# Patient Record
Sex: MALE | Race: BLACK OR AFRICAN AMERICAN | NOT HISPANIC OR LATINO | URBAN - METROPOLITAN AREA
[De-identification: names, ages, dates, MRNs, and addresses within clinical notes are randomized per-mention and may not be internally consistent; named-entity substitution may affect disease eponyms.]

---

## 2021-05-09 ENCOUNTER — EMERGENCY (EMERGENCY)
Facility: HOSPITAL | Age: 21
LOS: 1 days | Discharge: HOME | End: 2021-05-09
Attending: EMERGENCY MEDICINE | Admitting: EMERGENCY MEDICINE
Payer: MEDICAID

## 2021-05-09 VITALS
DIASTOLIC BLOOD PRESSURE: 73 MMHG | HEART RATE: 92 BPM | OXYGEN SATURATION: 100 % | TEMPERATURE: 98 F | RESPIRATION RATE: 18 BRPM | SYSTOLIC BLOOD PRESSURE: 134 MMHG

## 2021-05-09 PROCEDURE — 12004 RPR S/N/AX/GEN/TRK7.6-12.5CM: CPT

## 2021-05-09 PROCEDURE — 99284 EMERGENCY DEPT VISIT MOD MDM: CPT | Mod: 25

## 2021-05-09 RX ORDER — CEPHALEXIN 500 MG
1 CAPSULE ORAL
Qty: 28 | Refills: 0
Start: 2021-05-09 | End: 2021-05-15

## 2021-05-09 RX ORDER — DIAZEPAM 5 MG
5 TABLET ORAL ONCE
Refills: 0 | Status: DISCONTINUED | OUTPATIENT
Start: 2021-05-09 | End: 2021-05-09

## 2021-05-09 RX ORDER — CEPHALEXIN 500 MG
250 CAPSULE ORAL ONCE
Refills: 0 | Status: COMPLETED | OUTPATIENT
Start: 2021-05-09 | End: 2021-05-09

## 2021-05-09 RX ADMIN — Medication 5 MILLIGRAM(S): at 12:09

## 2021-05-09 RX ADMIN — Medication 250 MILLIGRAM(S): at 12:09

## 2021-05-09 NOTE — ED PROVIDER NOTE - NSFOLLOWUPINSTRUCTIONS_ED_ALL_ED_FT
You came to the ER with a laceration on your hand. We repaired it and washed it out. You should follow up with a hand surgeon, their number is below. If you are unable to feel your fingers, move your fingers, notice excessive swelling, bleeding, or discharge, color changes, or for any concern please return to the ER.    Please take the antibiotic KEFLEX/CEPHALEXIN as prescribed    Please take motrin and tylenol for the pain. Up to 1,800mg a day of motrin and 3,000mg a day of tylenol in 3 divided doses. You came to the ER with a laceration on your hand. We repaired it and washed it out. You should follow up with a hand surgeon, their number is below. If you are unable to feel your fingers, move your fingers, notice excessive swelling, bleeding, or discharge, color changes, or for any concern please return to the ER.    Please take the antibiotic KEFLEX/CEPHALEXIN as prescribed    Please return to the ER, follow up with a hand specialist, or go to a PCP/clinic to have your sutures removed in 10 days.    Please take motrin and tylenol for the pain. Up to 1,800mg a day of motrin and 3,000mg a day of tylenol in 3 divided doses.

## 2021-05-09 NOTE — ED PROVIDER NOTE - PATIENT PORTAL LINK FT
You can access the FollowMyHealth Patient Portal offered by [facility] by registering at the following website: [email or web address]. By joining [facility]’s FollowMyHealth portal, you will also be able to view your health information using other applications (apps) compatible with our system.

## 2021-05-09 NOTE — ED PROVIDER NOTE - OBJECTIVE STATEMENT
21 y/o M with no PMHx presents to the ED s/p climbing a fence when right hand got caught at the top. Bleeding controlled prior to arrival. Decreased motion due to pain. Sensation intact. Tetanus UTD. No pain medication taken prior to arrival. 21 y/o M with no PMHx presents to the ED s/p climbing a fence when right hand got caught at the top. Bleeding controlled prior to arrival. Decreased motion due to pain. Sensation intact. Tetanus UTD. No pain medication taken prior to arrival. No other injuries, no loc, did not hit head

## 2021-05-09 NOTE — ED PROVIDER NOTE - CLINICAL SUMMARY MEDICAL DECISION MAKING FREE TEXT BOX
21 y/o M with no PMHx with laceration to right palm extending proximal to the 3rd digit MCP, 2cm in jagged to the ulnar side extension along the medial aspect of the 3rd digit past the PIP. Neurovascularly intact. No apparent tendon injury. Pt able to range at MCP, DIP, POP. No foreign body visualized. Tetanus UTD. Will give analgesic and antibiosis. Numb the extremity, wash out and repair laceration. To have hand f/u. 19 y/o M with no PMHx with laceration to right palm extending proximal to the 3rd digit MCP, 2cm in jagged to the ulnar side extension along the medial aspect of the 3rd digit past the PIP. Neurovascularly intact. No apparent tendon injury. Pt able to range at MCP, DIP, POP. No foreign body visualized. Tetanus UTD. Will give analgesic and antibiosis. Numb the extremity, wash out and repair laceration. Abx. To have hand f/u.

## 2021-05-09 NOTE — ED ADULT TRIAGE NOTE - CHIEF COMPLAINT QUOTE
BIBEMS pt [place] park, slid [place] the fence and fut his right hand. As per EMS, pt has apprx 4in lac to R palm. Pt denies any head injuries. Dressing in place to R hand. No bleeding visible through dressing

## 2021-05-09 NOTE — ED PROCEDURE NOTE - PROCEDURE ADDITIONAL DETAILS
Simple interrupted suture of laceration of palm and 3rd digit, 3-0 sutures for palm, 4,0 for digit. Local for palm, digit block for digit lac. Well tolerated. Wound copiously irrigated with 1000ml of sterile water as well as an additional 500ml under pressure, no particulate matter, no evident tendon injury, neurovascular intact before and after procedure.

## 2021-05-09 NOTE — ED ADULT NURSE NOTE - OBJECTIVE STATEMENT
Pt received to 10B presents with rt hand laceration. Pt reports he was trying to get his basketball when his hand "got caught on the fence". Pt rt hand bleeding, pt medicated as per ordered. Will continue to monitor.

## 2021-05-09 NOTE — ED PROVIDER NOTE - NS ED ROS FT
Constitutional: (-) fever (-) vomiting  Eyes/ENT: (-) vision changes,  Cardiovascular: (-) chest pain, (-) wheezing  Respiratory: (-) cough, (-) shortness of breath  Gastrointestinal: (-) vomiting, (-) diarrhea, (-) abdominal pain  : (-) dysuria   Musculoskeletal: (-) back pain  Integumentary: (-) rash, +lac  Neurological: (-)loc  Allergic/Immunologic: (-) pruritus  Endocrine: No history of thyroid disease

## 2021-05-09 NOTE — ED PROVIDER NOTE - PHYSICAL EXAMINATION
Vitals: I have reviewed the patients vital signs  General: Well dressed, well appearing, no acute distress  HEENT: Atraumatic, normocephalic, airway patent  Eyes: EOMI, tracking appropriately  Neck: no tracheal deviation, no JVD  Chest/Lungs: no trauma, symmetric chest rise, speaking in complete sentences  Heart: skin and extremities well perfused, regular rate and rhythm  Neuro: A+Ox3, ambulating without difficulty, CN grossly intact  MSK: strength at baseline in all extremities, no muscle wasting or atrophy, able to range wrist and elbow without difficulty, neurovascular intact, flexor tendon intact both proximally and distally, extensor tendon intact  Skin: laceration to right palm extending proximal to the 3rd digit MCP, 2cm jagged, extension along the medial aspect of the 3rd digit past the PIP.

## 2025-07-16 NOTE — ED ADULT TRIAGE NOTE - TEMPERATURE IN FAHRENHEIT (DEGREES F)
Health Maintenance       COVID-19 Vaccine (4 - 2024-25 season)  Overdue since 9/1/2024    Traditional Medicare- Medicare Wellness Visit (Yearly)  Never done    Respiratory Syncytial Virus (RSV) Vaccine 60+ (1 - Risk 60-74 years 1-dose series)  Never done    Abdominal Aortic Aneurysm (AAA) Screening (Once)  Never done           Following review of the above:  Patient is not proceeding with: Abdominal Aortic Aneurysm (AAA) screening, COVID-19, and Respiratory Syncytial Virus (RSV)    Note: Refer to final orders and clinician documentation.       98